# Patient Record
Sex: FEMALE | Race: BLACK OR AFRICAN AMERICAN | NOT HISPANIC OR LATINO | URBAN - METROPOLITAN AREA
[De-identification: names, ages, dates, MRNs, and addresses within clinical notes are randomized per-mention and may not be internally consistent; named-entity substitution may affect disease eponyms.]

---

## 2024-05-16 ENCOUNTER — INPATIENT (INPATIENT)
Facility: HOSPITAL | Age: 34
LOS: 1 days | Discharge: HOME | End: 2024-05-18
Attending: STUDENT IN AN ORGANIZED HEALTH CARE EDUCATION/TRAINING PROGRAM | Admitting: STUDENT IN AN ORGANIZED HEALTH CARE EDUCATION/TRAINING PROGRAM
Payer: MEDICAID

## 2024-05-16 VITALS
TEMPERATURE: 99 F | SYSTOLIC BLOOD PRESSURE: 125 MMHG | RESPIRATION RATE: 17 BRPM | HEART RATE: 79 BPM | DIASTOLIC BLOOD PRESSURE: 79 MMHG

## 2024-05-16 LAB
BASOPHILS # BLD AUTO: 0.03 K/UL — SIGNIFICANT CHANGE UP (ref 0–0.2)
BASOPHILS NFR BLD AUTO: 0.3 % — SIGNIFICANT CHANGE UP (ref 0–2)
BLD GP AB SCN SERPL QL: NEGATIVE — SIGNIFICANT CHANGE UP
EOSINOPHIL # BLD AUTO: 0.04 K/UL — SIGNIFICANT CHANGE UP (ref 0–0.5)
EOSINOPHIL NFR BLD AUTO: 0.4 % — SIGNIFICANT CHANGE UP (ref 0–6)
HCT VFR BLD CALC: 35 % — SIGNIFICANT CHANGE UP (ref 34.5–45)
HGB BLD-MCNC: 12 G/DL — SIGNIFICANT CHANGE UP (ref 11.5–15.5)
IANC: 7.51 K/UL — HIGH (ref 1.8–7.4)
IMM GRANULOCYTES NFR BLD AUTO: 0.3 % — SIGNIFICANT CHANGE UP (ref 0–0.9)
LYMPHOCYTES # BLD AUTO: 1.64 K/UL — SIGNIFICANT CHANGE UP (ref 1–3.3)
LYMPHOCYTES # BLD AUTO: 16.5 % — SIGNIFICANT CHANGE UP (ref 13–44)
MCHC RBC-ENTMCNC: 29.9 PG — SIGNIFICANT CHANGE UP (ref 27–34)
MCHC RBC-ENTMCNC: 34.3 GM/DL — SIGNIFICANT CHANGE UP (ref 32–36)
MCV RBC AUTO: 87.3 FL — SIGNIFICANT CHANGE UP (ref 80–100)
MONOCYTES # BLD AUTO: 0.68 K/UL — SIGNIFICANT CHANGE UP (ref 0–0.9)
MONOCYTES NFR BLD AUTO: 6.8 % — SIGNIFICANT CHANGE UP (ref 2–14)
NEUTROPHILS # BLD AUTO: 7.51 K/UL — HIGH (ref 1.8–7.4)
NEUTROPHILS NFR BLD AUTO: 75.7 % — SIGNIFICANT CHANGE UP (ref 43–77)
NRBC # BLD: 0 /100 WBCS — SIGNIFICANT CHANGE UP (ref 0–0)
NRBC # FLD: 0 K/UL — SIGNIFICANT CHANGE UP (ref 0–0)
PLATELET # BLD AUTO: 234 K/UL — SIGNIFICANT CHANGE UP (ref 150–400)
RBC # BLD: 4.01 M/UL — SIGNIFICANT CHANGE UP (ref 3.8–5.2)
RBC # FLD: 14.2 % — SIGNIFICANT CHANGE UP (ref 10.3–14.5)
RH IG SCN BLD-IMP: POSITIVE — SIGNIFICANT CHANGE UP
WBC # BLD: 9.93 K/UL — SIGNIFICANT CHANGE UP (ref 3.8–10.5)
WBC # FLD AUTO: 9.93 K/UL — SIGNIFICANT CHANGE UP (ref 3.8–10.5)

## 2024-05-16 RX ORDER — CITRIC ACID/SODIUM CITRATE 300-500 MG
15 SOLUTION, ORAL ORAL EVERY 6 HOURS
Refills: 0 | Status: DISCONTINUED | OUTPATIENT
Start: 2024-05-16 | End: 2024-05-17

## 2024-05-16 RX ORDER — SODIUM CHLORIDE 9 MG/ML
1000 INJECTION, SOLUTION INTRAVENOUS
Refills: 0 | Status: DISCONTINUED | OUTPATIENT
Start: 2024-05-16 | End: 2024-05-17

## 2024-05-16 RX ORDER — OXYTOCIN 10 UNIT/ML
333.33 VIAL (ML) INJECTION
Qty: 20 | Refills: 0 | Status: DISCONTINUED | OUTPATIENT
Start: 2024-05-16 | End: 2024-05-17

## 2024-05-16 RX ORDER — CHLORHEXIDINE GLUCONATE 213 G/1000ML
1 SOLUTION TOPICAL DAILY
Refills: 0 | Status: DISCONTINUED | OUTPATIENT
Start: 2024-05-16 | End: 2024-05-17

## 2024-05-16 RX ADMIN — SODIUM CHLORIDE 125 MILLILITER(S): 9 INJECTION, SOLUTION INTRAVENOUS at 21:30

## 2024-05-16 RX ADMIN — SODIUM CHLORIDE 125 MILLILITER(S): 9 INJECTION, SOLUTION INTRAVENOUS at 18:40

## 2024-05-16 NOTE — OB PROVIDER H&P - NSGENETICTESTING_OBGYN_ALL_OB
Pts spouse called to follow up on the [facility] paper work that was given to  On the last visit(05/01/2024.  is asking for a call from Dr Anderson concerning this.    Not applicable

## 2024-05-16 NOTE — OB PROVIDER LABOR PROGRESS NOTE - ASSESSMENT
34y  @40w6d admitted for rrIOL now with cervical balloon in place    Continue cont EFM, IVF, toco  Continue BC/CB for IOL    Plan per Dr. Romi Mercer PGY1

## 2024-05-16 NOTE — OB PROVIDER H&P - ASSESSMENT
A/P: Pt is a 34y  who presents for rrIOL    1. Admit to L&D. Routine Labs. IVF  2. IOL with buccal cytotec and cervical balloon  3. Fetus: Vertex, CEFM  4. GBS neg  5. Pain: IV pain meds/epidural PRN     Discussed with Dr. Romi Mercer MD, PGY-1  Obstetrics and Gynecology

## 2024-05-16 NOTE — OB PROVIDER LABOR PROGRESS NOTE - ASSESSMENT
A/P:   - Labor:  eIOL. Currently on buccal cytotec, membranes intact. Plan to continue buccal cytotec and will re-evaluate after a few more doses.  - Fetus: cat 1  - GBS: negative  - Pain: epidural    Marii Magana, PGY-1  d/w Dr. Llanos

## 2024-05-16 NOTE — OB PROVIDER LABOR PROGRESS NOTE - ASSESSMENT
A/P:   - Labor:  eIOL. Currently on buccal cytotec. SROM clear fluid at 9:40PM. To be switched to pitocin vs continue buccal cytotec.  - Fetus: cat 2, overall status reassuring  - GBS: negative  - Pain: epidural    Marii Chino, PGY-1  d/w Dr. Llanos A/P:   - Labor:  eIOL. Currently on buccal cytotec. SROM clear fluid at 9:40PM. To  be expectantly managed, will start low dose pitocin if the contractions space out  - Fetus: cat 2, overall status reassuring  - GBS: negative  - Pain: epidural    Marii Magana, PGY-1  d/w Dr. Llanos

## 2024-05-16 NOTE — OB RN PATIENT PROFILE - WEIGHT: TOTAL WEIGHT IN LBS
Detail Level: Detailed Add 02748 Cpt? (Important Note: In 2017 The Use Of 37930 Is Being Tracked By Cms To Determine Future Global Period Reimbursement For Global Periods): no 6

## 2024-05-16 NOTE — OB RN PATIENT PROFILE - FALL HARM RISK - UNIVERSAL INTERVENTIONS
Bed in lowest position, wheels locked, appropriate side rails in place/Call bell, personal items and telephone in reach/Instruct patient to call for assistance before getting out of bed or chair/Non-slip footwear when patient is out of bed/[place] to call system/Physically safe environment - no spills, clutter or unnecessary equipment/Purposeful Proactive Rounding/Room/bathroom lighting operational, light cord in reach

## 2024-05-16 NOTE — OB PROVIDER H&P - HISTORY OF PRESENT ILLNESS
HPI: Pt is a 34y   @40w6d presenting for rrIOL. PNC otherwise uncomplicated.  FM (+)  LOF (-)  CTX (-)  VB (-)  GBS neg  EFW: 4000g    OBHx: SAB x1 no D&C ()  GynHx: Denies uterine polyps, ovarian cysts, no abnml paps or STI/STDs. +L lateral anterior [id number] fibroid  PMHx: Denies  PSHx: 2 knee surgeries  Med: PNV  All: NKDA  Psych: Denies hx of mental health issues  SH: Denies hx of smoking, drinking, or drug usage during the pregnancy

## 2024-05-16 NOTE — OB PROVIDER H&P - NSHPPHYSICALEXAM_GEN_ALL_CORE
Vital Signs Last 24 Hrs  T(C): --  T(F): --  HR: 79 (16 May 2024 17:20) (79 - 79)  BP: 125/79 (16 May 2024 17:20) (125/79 - 125/79)  BP(mean): --  RR: --  SpO2: --        SVE: 2.5/50/-3  Sono: Vertex

## 2024-05-17 LAB
RH IG SCN BLD-IMP: POSITIVE — SIGNIFICANT CHANGE UP
T PALLIDUM AB TITR SER: NEGATIVE — SIGNIFICANT CHANGE UP

## 2024-05-17 RX ORDER — IBUPROFEN 200 MG
600 TABLET ORAL EVERY 6 HOURS
Refills: 0 | Status: COMPLETED | OUTPATIENT
Start: 2024-05-17 | End: 2025-04-15

## 2024-05-17 RX ORDER — DIBUCAINE 1 %
1 OINTMENT (GRAM) RECTAL EVERY 6 HOURS
Refills: 0 | Status: DISCONTINUED | OUTPATIENT
Start: 2024-05-17 | End: 2024-05-18

## 2024-05-17 RX ORDER — HYDROCORTISONE 1 %
1 OINTMENT (GRAM) TOPICAL EVERY 6 HOURS
Refills: 0 | Status: DISCONTINUED | OUTPATIENT
Start: 2024-05-17 | End: 2024-05-18

## 2024-05-17 RX ORDER — SODIUM CHLORIDE 9 MG/ML
1000 INJECTION INTRAMUSCULAR; INTRAVENOUS; SUBCUTANEOUS
Refills: 0 | Status: DISCONTINUED | OUTPATIENT
Start: 2024-05-17 | End: 2024-05-17

## 2024-05-17 RX ORDER — SODIUM CHLORIDE 9 MG/ML
3 INJECTION INTRAMUSCULAR; INTRAVENOUS; SUBCUTANEOUS EVERY 8 HOURS
Refills: 0 | Status: DISCONTINUED | OUTPATIENT
Start: 2024-05-17 | End: 2024-05-18

## 2024-05-17 RX ORDER — IBUPROFEN 200 MG
1 TABLET ORAL
Qty: 0 | Refills: 0 | DISCHARGE
Start: 2024-05-17

## 2024-05-17 RX ORDER — LANOLIN
1 OINTMENT (GRAM) TOPICAL EVERY 6 HOURS
Refills: 0 | Status: DISCONTINUED | OUTPATIENT
Start: 2024-05-17 | End: 2024-05-18

## 2024-05-17 RX ORDER — BENZOCAINE 10 %
1 GEL (GRAM) MUCOUS MEMBRANE EVERY 6 HOURS
Refills: 0 | Status: DISCONTINUED | OUTPATIENT
Start: 2024-05-17 | End: 2024-05-18

## 2024-05-17 RX ORDER — OXYCODONE HYDROCHLORIDE 5 MG/1
5 TABLET ORAL ONCE
Refills: 0 | Status: DISCONTINUED | OUTPATIENT
Start: 2024-05-17 | End: 2024-05-18

## 2024-05-17 RX ORDER — OXYTOCIN 10 UNIT/ML
41.67 VIAL (ML) INJECTION
Qty: 20 | Refills: 0 | Status: DISCONTINUED | OUTPATIENT
Start: 2024-05-17 | End: 2024-05-17

## 2024-05-17 RX ORDER — ACETAMINOPHEN 500 MG
975 TABLET ORAL
Refills: 0 | Status: DISCONTINUED | OUTPATIENT
Start: 2024-05-17 | End: 2024-05-18

## 2024-05-17 RX ORDER — MAGNESIUM HYDROXIDE 400 MG/1
30 TABLET, CHEWABLE ORAL
Refills: 0 | Status: DISCONTINUED | OUTPATIENT
Start: 2024-05-17 | End: 2024-05-18

## 2024-05-17 RX ORDER — SIMETHICONE 80 MG/1
80 TABLET, CHEWABLE ORAL EVERY 4 HOURS
Refills: 0 | Status: DISCONTINUED | OUTPATIENT
Start: 2024-05-17 | End: 2024-05-18

## 2024-05-17 RX ORDER — DIPHENHYDRAMINE HCL 50 MG
25 CAPSULE ORAL EVERY 6 HOURS
Refills: 0 | Status: DISCONTINUED | OUTPATIENT
Start: 2024-05-17 | End: 2024-05-18

## 2024-05-17 RX ORDER — OXYCODONE HYDROCHLORIDE 5 MG/1
5 TABLET ORAL
Refills: 0 | Status: DISCONTINUED | OUTPATIENT
Start: 2024-05-17 | End: 2024-05-18

## 2024-05-17 RX ORDER — IBUPROFEN 200 MG
600 TABLET ORAL EVERY 6 HOURS
Refills: 0 | Status: DISCONTINUED | OUTPATIENT
Start: 2024-05-17 | End: 2024-05-18

## 2024-05-17 RX ORDER — TETANUS TOXOID, REDUCED DIPHTHERIA TOXOID AND ACELLULAR PERTUSSIS VACCINE, ADSORBED 5; 2.5; 8; 8; 2.5 [IU]/.5ML; [IU]/.5ML; UG/.5ML; UG/.5ML; UG/.5ML
0.5 SUSPENSION INTRAMUSCULAR ONCE
Refills: 0 | Status: DISCONTINUED | OUTPATIENT
Start: 2024-05-17 | End: 2024-05-18

## 2024-05-17 RX ORDER — AER TRAVELER 0.5 G/1
1 SOLUTION RECTAL; TOPICAL EVERY 4 HOURS
Refills: 0 | Status: DISCONTINUED | OUTPATIENT
Start: 2024-05-17 | End: 2024-05-18

## 2024-05-17 RX ORDER — KETOROLAC TROMETHAMINE 30 MG/ML
30 SYRINGE (ML) INJECTION ONCE
Refills: 0 | Status: DISCONTINUED | OUTPATIENT
Start: 2024-05-17 | End: 2024-05-17

## 2024-05-17 RX ORDER — PRAMOXINE HYDROCHLORIDE 150 MG/15G
1 AEROSOL, FOAM RECTAL EVERY 4 HOURS
Refills: 0 | Status: DISCONTINUED | OUTPATIENT
Start: 2024-05-17 | End: 2024-05-18

## 2024-05-17 RX ADMIN — Medication 975 MILLIGRAM(S): at 21:32

## 2024-05-17 RX ADMIN — Medication 600 MILLIGRAM(S): at 18:03

## 2024-05-17 RX ADMIN — Medication 975 MILLIGRAM(S): at 08:36

## 2024-05-17 RX ADMIN — Medication 975 MILLIGRAM(S): at 09:30

## 2024-05-17 RX ADMIN — Medication 30 MILLIGRAM(S): at 06:00

## 2024-05-17 RX ADMIN — Medication 30 MILLIGRAM(S): at 05:31

## 2024-05-17 RX ADMIN — SODIUM CHLORIDE 3 MILLILITER(S): 9 INJECTION INTRAMUSCULAR; INTRAVENOUS; SUBCUTANEOUS at 13:40

## 2024-05-17 RX ADMIN — SODIUM CHLORIDE 3 MILLILITER(S): 9 INJECTION INTRAMUSCULAR; INTRAVENOUS; SUBCUTANEOUS at 22:00

## 2024-05-17 RX ADMIN — SODIUM CHLORIDE 125 MILLILITER(S): 9 INJECTION INTRAMUSCULAR; INTRAVENOUS; SUBCUTANEOUS at 01:12

## 2024-05-17 RX ADMIN — Medication 1 TABLET(S): at 12:43

## 2024-05-17 RX ADMIN — SODIUM CHLORIDE 125 MILLILITER(S): 9 INJECTION, SOLUTION INTRAVENOUS at 01:12

## 2024-05-17 RX ADMIN — Medication 975 MILLIGRAM(S): at 16:10

## 2024-05-17 RX ADMIN — Medication 600 MILLIGRAM(S): at 13:40

## 2024-05-17 RX ADMIN — Medication 975 MILLIGRAM(S): at 22:00

## 2024-05-17 RX ADMIN — Medication 600 MILLIGRAM(S): at 12:43

## 2024-05-17 RX ADMIN — Medication 975 MILLIGRAM(S): at 15:14

## 2024-05-17 RX ADMIN — Medication 600 MILLIGRAM(S): at 19:00

## 2024-05-17 RX ADMIN — Medication 125 MILLIUNIT(S)/MIN: at 04:43

## 2024-05-17 NOTE — OB RN DELIVERY SUMMARY - NS_NUCHALCORD_OBGYN_ALL_OB
PDMP reviewed  Last dispensed: 2/2/24  Quantity: 60  Last prescribed by: Rosalia Irwin  Pharmacy filled by: [id number] - [place], [place] - [address]  [address] [address]     Writer spoke with patient and patient is needing his 20 mg of Adderall before he leaves today at 12, he is taking his mother out of town for her cancer treatment. Patient was made aware that there is no guaranty that the prescriptions will be ready by then since PCP is not in the office today but writer told patient that we will send it as high priority. Patient had no further questions at this time.   None

## 2024-05-17 NOTE — OB NEONATOLOGY/PEDIATRICIAN DELIVERY SUMMARY - NSPEDSNEONOTESA_OBGYN_ALL_OB_FT
Peds called to LDR for Cat 2 of 41 wk AGA  female born via  to a 40 y/o  mother.   No significant maternal or prenatal history. Maternal labs include Blood Type O+, HIV - , RPR NR , Rubella I , Hep B - , GBS - . SROM at 2140 on  with clear fluids (ROM hours: ~6H).  Baby emerged vigorous, crying, was w/d/s/s with APGARS of 8/9.. Resuscitation included: tactile stimulation and bulb suction. Mom plans to initiate breastfeeding, declines Hep B vaccine .  Highest maternal temp: 37.3. EOS 0.22.    :   TOB: 0334  Weight: 3720g    Physical Exam:  Gen: no acute distress, +grimace  HEENT:  anterior fontanel open soft and flat, nondysmorphic facies, no cleft lip/palate, ears normal set, no ear pits or tags, nares clinically patent, (+) mild caput  Resp: Normal respiratory effort without grunting or retractions, good air entry b/l, clear to auscultation bilaterally  Cardio: Present S1/S2, regular rate and rhythm, no murmurs  Abd: soft, non tender, non distended, umbilical cord with 3 vessels  Neuro: +palmar and plantar grasp, +suck, +najma, normal tone  Extremities: negative kuo and ortolani maneuvers, moving all extremities, no clavicular crepitus or stepoff  Skin: pink, warm  Genitals:Normal female anatomy, [place] 1, anus patent

## 2024-05-17 NOTE — DISCHARGE NOTE OB - CARE PLAN
Principal Discharge DX:	 (normal spontaneous vaginal delivery)  Assessment and plan of treatment:	Return to normal activities of daily living   1

## 2024-05-17 NOTE — DISCHARGE NOTE OB - MATERIALS PROVIDED
Vaccinations/  Immunization Record/Breastfeeding Log/Guide to Postpartum Care/Back To Sleep Handout/Shaken Baby Prevention Handout/Birth Certificate Instructions/Discharge Medication Information for Patients and Families Pocket Guide

## 2024-05-17 NOTE — OB PROVIDER DELIVERY SUMMARY - NSLOWPPHRISK_OBGYN_A_OB
No previous uterine incision/[place] Pregnancy/Less than or equal to 4 previous vaginal births/No known bleeding disorder/No history of postpartum hemorrhage

## 2024-05-17 NOTE — DISCHARGE NOTE OB - CARE PROVIDER_API CALL
Alfredo Blackwell  Obstetrics and Gynecology  [address], [address]  [place] [address]  Phone: [phone]  Fax: [phone]  Follow Up Time:

## 2024-05-17 NOTE — OB RN DELIVERY SUMMARY - NS_SEPSISRSKCALC_OBGYN_ALL_OB_FT
EOS calculated successfully. EOS Risk Factor: 0.5/1000 live births ([facility] national incidence); GA=41w;Temp=99.14; ROM=5.9; GBS='Negative'; Antibiotics='No antibiotics or any antibiotics < 2 hrs prior to birth'

## 2024-05-17 NOTE — OB PROVIDER DELIVERY SUMMARY - NSSELHIDDEN_OBGYN_ALL_OB_FT
[NS_DeliveryAttending1_OBGYN_ALL_OB_FT:[email or web address]=],[NS_DeliveryAssist1_OBGYN_ALL_OB_FT:[id number]=]

## 2024-05-17 NOTE — DISCHARGE NOTE OB - NS MD DC FALL RISK RISK
today/chest pressure For information on Fall & Injury Prevention, visit: [email or web address] OR  [email or web address] OR  https://www.cdc.gov/steadi/patient.html

## 2024-05-17 NOTE — OB RN DELIVERY SUMMARY - NSSELHIDDEN_OBGYN_ALL_OB_FT
[NS_DeliveryAttending1_OBGYN_ALL_OB_FT:[email or web address]=],[NS_DeliveryAssist1_OBGYN_ALL_OB_FT:[id number]=],[NS_DeliveryRN_OBGYN_ALL_OB_FT:[id number]=]

## 2024-05-17 NOTE — DISCHARGE NOTE OB - PATIENT PORTAL LINK FT
You can access the FollowMyHealth Patient Portal offered by [facility] by registering at the following website: [email or web address]. By joining [facility]’s FollowMyHealth portal, you will also be able to view your health information using other applications (apps) compatible with our system.

## 2024-05-17 NOTE — OB PROVIDER LABOR PROGRESS NOTE - ASSESSMENT
Plan: 33yo  @ 41w. Recurrent late decels  - IVF bolus  - IUPC placed and amnioinfusion started  - no induction agents at this time  - Continuous EFM, [place]  - Con't IVF    d/w attending physician Dr. Romi Lujan MD  PGY-2 Plan: 35yo  @ 41w. Recurrent late decels  - IVF bolus  - IUPC placed and amnioinfusion started  - no induction agents at this time  - Continuous EFM, [place]  - Con't IVF    d/w attending physician Dr. Romi Lujan MD  PGY-2 Plan: 35yo  @ [facility]. Recurrent late/mel decels for last 20-30 minutes despite maternal repositioning and fluids  - patient already received IVF bolus  - IUPC placed and amnioinfusion started  - no induction agents at this time  - Continuous EFM, [place]  - Con't IVF    d/w attending physician Dr. Romi Lujan MD  PGY-2

## 2024-05-17 NOTE — OB PROVIDER DELIVERY SUMMARY - NS_ADMITROM_OBGYN_ALL_OB
normal... Well appearing, well nourished, awake, alert, oriented to person, place, time/situation and in no apparent distress. No Well appearing, well nourished, anxious, awake, alert, oriented to person, place, time/situation. Well appearing, well nourished, anxious, awake, alert, oriented to person, place, time/situation, in on acute distress.

## 2024-05-17 NOTE — OB PROVIDER LABOR PROGRESS NOTE - NS_OBIHIFHRDETAILS_OBGYN_ALL_OB_FT
baseline 145, mod mel, - accels, recurrent late decels baseline 145, mod mel, - accels, recurrent late/variable decels

## 2024-05-17 NOTE — DISCHARGE NOTE OB - HOSPITAL COURSE
Patient admitted for induction of labor, she had a normal spontaneous vaginal delivery of a live female infant, her postpartum course was uncomplicated

## 2024-05-17 NOTE — DISCHARGE NOTE OB - MEDICATION SUMMARY - MEDICATIONS TO TAKE
I will START or STAY ON the medications listed below when I get home from the hospital:    ibuprofen 600 mg oral tablet  -- 1 tab(s) by mouth every 6 hours  -- Indication: For Pain   I will START or STAY ON the medications listed below when I get home from the hospital:    ibuprofen 600 mg oral tablet  -- 1 tab(s) by mouth every 6 hours as needed for  moderate pain  -- Indication: For Pain    acetaminophen 325 mg oral tablet  -- 3 tab(s) by mouth every 6 hours as needed for  mild pain  -- Indication: For pain    Prenatal Multivitamins with Folic Acid 1 mg oral tablet  -- 1 tab(s) by mouth once a day  -- Indication: For vitamins

## 2024-05-17 NOTE — OB PROVIDER DELIVERY SUMMARY - NSPROVIDERDELIVERYNOTE_OBGYN_ALL_OB_FT
The patient became fully dilated with urge to push.  of a viable female infant. Head, shoulders and body delivered easily in OA position. No nuchal cord. There was delayed cord clamping of 1min. Cord gases obtained. The placenta delivered spontaneously, intact, with a three vessel cord. Pitocin was administered. The uterus, cervix, vagina and perineum were palpated and inspected, no retained products were noted. Bimanual exam performed, with minimal clot evacuated. Fundus and lower uterine segment firm. Second degree laceration of the perineum noted and Right periurethral laceration noted. The lacerations were repaired with chromic in the usual manner with restoration of anatomy and excellent hemostasis.    Present for delivery were Dr. Chino Nguyen PGY-1

## 2024-05-18 VITALS
DIASTOLIC BLOOD PRESSURE: 75 MMHG | TEMPERATURE: 98 F | OXYGEN SATURATION: 100 % | RESPIRATION RATE: 18 BRPM | SYSTOLIC BLOOD PRESSURE: 117 MMHG | HEART RATE: 75 BPM

## 2024-05-18 RX ORDER — ACETAMINOPHEN 500 MG
3 TABLET ORAL
Qty: 0 | Refills: 0 | DISCHARGE
Start: 2024-05-18

## 2024-05-18 RX ADMIN — Medication 600 MILLIGRAM(S): at 13:00

## 2024-05-18 RX ADMIN — MAGNESIUM HYDROXIDE 30 MILLILITER(S): 400 TABLET, CHEWABLE ORAL at 06:36

## 2024-05-18 RX ADMIN — SODIUM CHLORIDE 3 MILLILITER(S): 9 INJECTION INTRAMUSCULAR; INTRAVENOUS; SUBCUTANEOUS at 07:00

## 2024-05-18 RX ADMIN — Medication 600 MILLIGRAM(S): at 00:27

## 2024-05-18 RX ADMIN — Medication 975 MILLIGRAM(S): at 08:22

## 2024-05-18 RX ADMIN — Medication 600 MILLIGRAM(S): at 01:00

## 2024-05-18 RX ADMIN — Medication 975 MILLIGRAM(S): at 04:01

## 2024-05-18 RX ADMIN — Medication 975 MILLIGRAM(S): at 09:13

## 2024-05-18 RX ADMIN — Medication 600 MILLIGRAM(S): at 12:12

## 2024-05-18 RX ADMIN — Medication 975 MILLIGRAM(S): at 03:31

## 2024-05-18 RX ADMIN — Medication 600 MILLIGRAM(S): at 06:32

## 2024-05-18 RX ADMIN — Medication 600 MILLIGRAM(S): at 07:00

## 2024-05-18 NOTE — CHART NOTE - NSCHARTNOTEFT_GEN_A_CORE
ATT:  Review of past 2 hrs of labor course. FRom 10:30-midnight- FHR with variable and late decels despite fluids, position changes. IUPC with amnio infusion placed with slow resolution of decels. FHR tracing category I with ctx q 2-4 mins with some coupling of ctx. Pt having more pressure with cervix tight 8 cm/80%. -1. Will give epidural bolus and continue to closely observe. Dr Blackwell informed and heading to hospital. Will consider pitocin to break ctx coupling. Mayela Llanos MD
PTA requested for cat 2 FHR tracing, recurrent late decelerations.   Charge RN, covering RN and  present.  Patient re-examined, , patient feels pressure with contractions but recently topped off.   Moderate variability with overall reassuring fetal status, patient making cervical change.  Will continue position changes, peanut ball to allow vertex to descend. Plan to recheck in 1-2 hours or earlier if necessary.  Anticipate     Alfredo Blackwell MD
ATT:  S: Patient doing well. Minimal lochia. Pain controlled. breastfeeding    O: Vital Signs Last 24 Hrs  T(C): 36.6 (18 May 2024 05:55), Max: 36.9 (17 May 2024 19:16)  T(F): 97.9 (18 May 2024 05:55), Max: 98.5 (17 May 2024 19:16)  HR: 75 (18 May 2024 05:55) (72 - 85)  BP: 131/84 (18 May 2024 05:55) (107/75 - 131/84)  BP(mean): --  RR: 18 (18 May 2024 05:55) (18 - 18)  SpO2: 100% (18 May 2024 05:55) (98% - 100%)    Parameters below as of 18 May 2024 05:55  Patient On (Oxygen Delivery Method): room air        Gen: NAD  Abd: soft, NT, ND, fundus firm below umbilicus  Ext: no tenderness    Labs:                        12.0   9.93  )-----------( 234      ( 16 May 2024 18:02 )             35.0       A: 34y PPD#1 s/p  doing well. Pt desires discharge today.    Plan: Continue routine postpartum care. Discharge home today.  Mayela Llanos MD

## 2024-07-29 NOTE — OB PROVIDER LABOR PROGRESS NOTE - NS_SUBJECTIVE/OBJECTIVE_OBGYN_ALL_OB_FT
Patient needs to follow up with (Gastro) office will call with appointment [phone]  Patient needs to follow up with (Surgery) on 8/5/24 @1:45pm [phone]  Patient needs to follow up with Rena Guerra(PCP) on 8/9/24 @11:00am [phone]  
R1 OB Labor Note    S: Patient seen and examined at bedside. SROM clear fluid at 9:40PM    T(C): 37.3 (05-16-24 @ 18:15), Max: 37.3 (05-16-24 @ 17:17)  HR: 72 (05-16-24 @ 21:59) (68 - 85)  BP: 112/59 (05-16-24 @ 21:58) (99/57 - 135/81)  RR: 17 (05-16-24 @ 18:15) (17 - 17)  SpO2: 100% (05-16-24 @ 21:59) (89% - 100%)
Pt seen and examined for cervical balloon placement. Cervical balloon placed in usual fashion.
R1 OB Labor Note    S: Patient seen and examined at bedside.    T(C): 37.3 (05-16-24 @ 18:15), Max: 37.3 (05-16-24 @ 17:17)  HR: 77 (05-16-24 @ 21:19) (68 - 85)  BP: 106/59 (05-16-24 @ 21:12) (99/57 - 135/81)  RR: 17 (05-16-24 @ 18:15) (17 - 17)  SpO2: 100% (05-16-24 @ 21:14) (89% - 100%)
R2 Labor & Delivery Progress Note     Pt seen & examined at bedside.    T(C): 36.9 (05-16-24 @ 22:00), Max: 37.3 (05-16-24 @ 17:17)  HR: 78 (05-17-24 @ 00:40) (68 - 88)  BP: 129/62 (05-17-24 @ 00:27) (99/57 - 135/81)  RR: 18 (05-16-24 @ 22:00) (17 - 18)  SpO2: 100% (05-17-24 @ 00:40) (71% - 100%)

## 2025-03-01 ENCOUNTER — NON-APPOINTMENT (OUTPATIENT)
Age: 35
End: 2025-03-01

## 2025-03-03 ENCOUNTER — APPOINTMENT (OUTPATIENT)
Dept: OBGYN | Facility: CLINIC | Age: 35
End: 2025-03-03
Payer: MEDICAID

## 2025-03-03 ENCOUNTER — ASOB RESULT (OUTPATIENT)
Age: 35
End: 2025-03-03

## 2025-03-03 VITALS
WEIGHT: 244 LBS | SYSTOLIC BLOOD PRESSURE: 131 MMHG | HEIGHT: 70 IN | HEART RATE: 81 BPM | BODY MASS INDEX: 34.93 KG/M2 | DIASTOLIC BLOOD PRESSURE: 84 MMHG

## 2025-03-03 DIAGNOSIS — Z32.01 ENCOUNTER FOR PREGNANCY TEST, RESULT POSITIVE: ICD-10-CM

## 2025-03-03 PROCEDURE — 76830 TRANSVAGINAL US NON-OB: CPT

## 2025-03-03 PROCEDURE — 99214 OFFICE O/P EST MOD 30 MIN: CPT

## 2025-03-04 LAB
C TRACH RRNA SPEC QL NAA+PROBE: NOT DETECTED
N GONORRHOEA RRNA SPEC QL NAA+PROBE: NOT DETECTED
SOURCE AMPLIFICATION: NORMAL

## 2025-03-05 LAB — BACTERIA UR CULT: NORMAL

## 2025-03-20 ENCOUNTER — APPOINTMENT (OUTPATIENT)
Dept: OBGYN | Facility: CLINIC | Age: 35
End: 2025-03-20
Payer: MEDICAID

## 2025-03-20 ENCOUNTER — ASOB RESULT (OUTPATIENT)
Age: 35
End: 2025-03-20

## 2025-03-20 VITALS
WEIGHT: 241 LBS | HEIGHT: 70 IN | DIASTOLIC BLOOD PRESSURE: 80 MMHG | SYSTOLIC BLOOD PRESSURE: 120 MMHG | HEART RATE: 80 BPM | BODY MASS INDEX: 34.5 KG/M2

## 2025-03-20 DIAGNOSIS — Z32.00 ENCOUNTER FOR PREGNANCY TEST, RESULT UNKNOWN: ICD-10-CM

## 2025-03-20 PROCEDURE — 99213 OFFICE O/P EST LOW 20 MIN: CPT

## 2025-03-20 PROCEDURE — 76817 TRANSVAGINAL US OBSTETRIC: CPT

## 2025-03-21 LAB
ABORH: NORMAL
ANTIBODY SCREEN: NORMAL
BASOPHILS # BLD AUTO: 0.08 K/UL
BASOPHILS NFR BLD AUTO: 0.7 %
EOSINOPHIL # BLD AUTO: 0.03 K/UL
EOSINOPHIL NFR BLD AUTO: 0.3 %
ESTIMATED AVERAGE GLUCOSE: 120 MG/DL
HBA1C MFR BLD HPLC: 5.8 %
HCT VFR BLD CALC: 40.7 %
HGB BLD-MCNC: 13.4 G/DL
HIV1+2 AB SPEC QL IA.RAPID: NONREACTIVE
IMM GRANULOCYTES NFR BLD AUTO: 0.4 %
LEAD BLD-MCNC: 1.5 UG/DL
LYMPHOCYTES # BLD AUTO: 1.83 K/UL
LYMPHOCYTES NFR BLD AUTO: 16.3 %
MAN DIFF?: NORMAL
MCHC RBC-ENTMCNC: 31.6 PG
MCHC RBC-ENTMCNC: 32.9 G/DL
MCV RBC AUTO: 96 FL
MEV IGG FLD QL IA: 12.5 AU/ML
MEV IGG+IGM SER-IMP: NEGATIVE
MONOCYTES # BLD AUTO: 0.71 K/UL
MONOCYTES NFR BLD AUTO: 6.3 %
NEUTROPHILS # BLD AUTO: 8.54 K/UL
NEUTROPHILS NFR BLD AUTO: 76 %
PLATELET # BLD AUTO: 348 K/UL
RBC # BLD: 4.24 M/UL
RBC # FLD: 13.5 %
RUBV IGG FLD-ACNC: 0.88 INDEX
RUBV IGG SER-IMP: NEGATIVE
T PALLIDUM AB SER QL IA: NEGATIVE
VZV AB TITR SER: POSITIVE
VZV IGG SER IF-ACNC: 8.47 S/CO
WBC # FLD AUTO: 11.23 K/UL

## 2025-03-24 LAB
HBV SURFACE AG SER QL: NONREACTIVE
HCV AB SER QL: NONREACTIVE
HCV S/CO RATIO: 0.12 S/CO

## 2025-04-01 ENCOUNTER — TRANSCRIPTION ENCOUNTER (OUTPATIENT)
Age: 35
End: 2025-04-01

## 2025-04-03 ENCOUNTER — TRANSCRIPTION ENCOUNTER (OUTPATIENT)
Age: 35
End: 2025-04-03

## 2025-04-07 ENCOUNTER — APPOINTMENT (OUTPATIENT)
Dept: OBGYN | Facility: CLINIC | Age: 35
End: 2025-04-07
Payer: MEDICAID

## 2025-04-07 ENCOUNTER — ASOB RESULT (OUTPATIENT)
Age: 35
End: 2025-04-07

## 2025-04-07 VITALS
DIASTOLIC BLOOD PRESSURE: 77 MMHG | WEIGHT: 239 LBS | BODY MASS INDEX: 34.22 KG/M2 | SYSTOLIC BLOOD PRESSURE: 117 MMHG | HEART RATE: 86 BPM | HEIGHT: 70 IN

## 2025-04-07 DIAGNOSIS — O41.8X90 OTHER SPECIFIED DISORDERS OF AMNIOTIC FLUID AND MEMBRANES, UNSPECIFIED TRIMESTER, NOT APPLICABLE OR UNSPECIFIED: ICD-10-CM

## 2025-04-07 DIAGNOSIS — O46.8X9 OTHER SPECIFIED DISORDERS OF AMNIOTIC FLUID AND MEMBRANES, UNSPECIFIED TRIMESTER, NOT APPLICABLE OR UNSPECIFIED: ICD-10-CM

## 2025-04-07 PROCEDURE — 76816 OB US FOLLOW-UP PER FETUS: CPT

## 2025-04-07 PROCEDURE — 99214 OFFICE O/P EST MOD 30 MIN: CPT

## 2025-04-10 ENCOUNTER — APPOINTMENT (OUTPATIENT)
Dept: ANTEPARTUM | Facility: CLINIC | Age: 35
End: 2025-04-10

## 2025-04-24 ENCOUNTER — NON-APPOINTMENT (OUTPATIENT)
Age: 35
End: 2025-04-24

## 2025-04-24 ENCOUNTER — APPOINTMENT (OUTPATIENT)
Dept: ANTEPARTUM | Facility: CLINIC | Age: 35
End: 2025-04-24
Payer: MEDICAID

## 2025-04-24 ENCOUNTER — APPOINTMENT (OUTPATIENT)
Dept: OBGYN | Facility: CLINIC | Age: 35
End: 2025-04-24
Payer: MEDICAID

## 2025-04-24 ENCOUNTER — ASOB RESULT (OUTPATIENT)
Age: 35
End: 2025-04-24

## 2025-04-24 VITALS
BODY MASS INDEX: 34.65 KG/M2 | HEIGHT: 70 IN | DIASTOLIC BLOOD PRESSURE: 82 MMHG | SYSTOLIC BLOOD PRESSURE: 129 MMHG | WEIGHT: 242 LBS | HEART RATE: 83 BPM

## 2025-04-24 DIAGNOSIS — Z34.81 ENCOUNTER FOR SUPERVISION OF OTHER NORMAL PREGNANCY, FIRST TRIMESTER: ICD-10-CM

## 2025-04-24 PROCEDURE — 76801 OB US < 14 WKS SINGLE FETUS: CPT

## 2025-04-24 PROCEDURE — 76813 OB US NUCHAL MEAS 1 GEST: CPT

## 2025-04-24 PROCEDURE — 0502F SUBSEQUENT PRENATAL CARE: CPT

## 2025-05-22 ENCOUNTER — NON-APPOINTMENT (OUTPATIENT)
Age: 35
End: 2025-05-22

## 2025-05-22 ENCOUNTER — APPOINTMENT (OUTPATIENT)
Dept: OBGYN | Facility: CLINIC | Age: 35
End: 2025-05-22
Payer: MEDICAID

## 2025-05-22 VITALS
BODY MASS INDEX: 34.65 KG/M2 | DIASTOLIC BLOOD PRESSURE: 65 MMHG | SYSTOLIC BLOOD PRESSURE: 108 MMHG | HEART RATE: 86 BPM | WEIGHT: 242 LBS | HEIGHT: 70 IN

## 2025-05-22 DIAGNOSIS — Z34.91 ENCOUNTER FOR SUPERVISION OF NORMAL PREGNANCY, UNSPECIFIED, FIRST TRIMESTER: ICD-10-CM

## 2025-05-22 PROCEDURE — 0502F SUBSEQUENT PRENATAL CARE: CPT

## 2025-05-23 LAB
2ND TRIMESTER 4 SCREEN SERPL-IMP: NO
AFP ADJ MOM SERPL: 0.68
AFP INTERP SERPL-IMP: NORMAL
AFP INTERP SERPL-IMP: NORMAL
AFP MOM CUT-OFF: 2.8
AFP PERCENTILE: 11.4
AFP SERPL-ACNC: 22.49 NG/ML
CARBAMAZEPINE?: NO
CURRENT SMOKER: NO
DIABETES STATUS PATIENT: NO
GA: NORMAL
GESTATIONAL AGE METHOD: NORMAL
HX OF NTD NARR: NO
MULTIPLE PREGNANCY: NORMAL
NEURAL TUBE DEFECT RISK FETUS: NORMAL
NEURAL TUBE DEFECT RISK POP: NORMAL
TEST PERFORMANCE INFO SPEC: NORMAL
VALPROIC ACID?: NORMAL

## 2025-05-28 ENCOUNTER — RX RENEWAL (OUTPATIENT)
Age: 35
End: 2025-05-28

## 2025-05-28 ENCOUNTER — TRANSCRIPTION ENCOUNTER (OUTPATIENT)
Age: 35
End: 2025-05-28

## 2025-05-29 ENCOUNTER — NON-APPOINTMENT (OUTPATIENT)
Age: 35
End: 2025-05-29

## 2025-06-11 ENCOUNTER — APPOINTMENT (OUTPATIENT)
Dept: ANTEPARTUM | Facility: CLINIC | Age: 35
End: 2025-06-11

## 2025-06-11 ENCOUNTER — ASOB RESULT (OUTPATIENT)
Age: 35
End: 2025-06-11

## 2025-06-11 PROCEDURE — 76811 OB US DETAILED SNGL FETUS: CPT

## 2025-06-25 ENCOUNTER — ASOB RESULT (OUTPATIENT)
Age: 35
End: 2025-06-25

## 2025-06-25 ENCOUNTER — APPOINTMENT (OUTPATIENT)
Dept: ANTEPARTUM | Facility: CLINIC | Age: 35
End: 2025-06-25

## 2025-06-25 PROCEDURE — 76816 OB US FOLLOW-UP PER FETUS: CPT

## 2025-06-30 ENCOUNTER — APPOINTMENT (OUTPATIENT)
Dept: OBGYN | Facility: CLINIC | Age: 35
End: 2025-06-30
Payer: MEDICAID

## 2025-06-30 VITALS
DIASTOLIC BLOOD PRESSURE: 70 MMHG | HEIGHT: 70 IN | HEART RATE: 85 BPM | WEIGHT: 245 LBS | SYSTOLIC BLOOD PRESSURE: 107 MMHG | BODY MASS INDEX: 35.07 KG/M2

## 2025-06-30 PROBLEM — Z34.82 ENCOUNTER FOR SUPERVISION OF NORMAL PREGNANCY IN MULTIGRAVIDA IN SECOND TRIMESTER: Status: ACTIVE | Noted: 2025-06-30

## 2025-06-30 PROCEDURE — 0502F SUBSEQUENT PRENATAL CARE: CPT

## 2025-07-23 ENCOUNTER — APPOINTMENT (OUTPATIENT)
Dept: OBGYN | Facility: CLINIC | Age: 35
End: 2025-07-23

## 2025-07-28 ENCOUNTER — MED ADMIN CHARGE (OUTPATIENT)
Age: 35
End: 2025-07-28

## 2025-07-28 ENCOUNTER — APPOINTMENT (OUTPATIENT)
Dept: OBGYN | Facility: CLINIC | Age: 35
End: 2025-07-28
Payer: MEDICAID

## 2025-07-28 VITALS
DIASTOLIC BLOOD PRESSURE: 71 MMHG | SYSTOLIC BLOOD PRESSURE: 109 MMHG | WEIGHT: 241 LBS | HEART RATE: 88 BPM | BODY MASS INDEX: 34.5 KG/M2 | HEIGHT: 70 IN

## 2025-07-28 DIAGNOSIS — O09.522 SUPERVISION OF ELDERLY MULTIGRAVIDA, SECOND TRIMESTER: ICD-10-CM

## 2025-07-28 DIAGNOSIS — Z23 ENCOUNTER FOR IMMUNIZATION: ICD-10-CM

## 2025-07-28 PROCEDURE — 90715 TDAP VACCINE 7 YRS/> IM: CPT

## 2025-07-28 PROCEDURE — 0502F SUBSEQUENT PRENATAL CARE: CPT

## 2025-07-28 PROCEDURE — 90471 IMMUNIZATION ADMIN: CPT

## 2025-07-29 LAB
GLUCOSE 1H P 50 G GLC PO SERPL-MCNC: 122 MG/DL
HCT VFR BLD CALC: 35.4 %
HGB BLD-MCNC: 11.7 G/DL
MCHC RBC-ENTMCNC: 29.8 PG
MCHC RBC-ENTMCNC: 33.1 G/DL
MCV RBC AUTO: 90.3 FL
PLATELET # BLD AUTO: 278 K/UL
RBC # BLD: 3.92 M/UL
RBC # FLD: 13.8 %
WBC # FLD AUTO: 9.17 K/UL

## 2025-08-20 ENCOUNTER — APPOINTMENT (OUTPATIENT)
Dept: OBGYN | Facility: CLINIC | Age: 35
End: 2025-08-20
Payer: MEDICAID

## 2025-08-20 ENCOUNTER — NON-APPOINTMENT (OUTPATIENT)
Age: 35
End: 2025-08-20

## 2025-08-20 VITALS
WEIGHT: 242 LBS | HEART RATE: 91 BPM | SYSTOLIC BLOOD PRESSURE: 107 MMHG | HEIGHT: 70 IN | DIASTOLIC BLOOD PRESSURE: 72 MMHG | BODY MASS INDEX: 34.65 KG/M2

## 2025-08-20 DIAGNOSIS — Z34.82 ENCOUNTER FOR SUPERVISION OF OTHER NORMAL PREGNANCY, SECOND TRIMESTER: ICD-10-CM

## 2025-08-20 PROCEDURE — 0502F SUBSEQUENT PRENATAL CARE: CPT

## 2025-08-26 ENCOUNTER — APPOINTMENT (OUTPATIENT)
Dept: ANTEPARTUM | Facility: CLINIC | Age: 35
End: 2025-08-26

## 2025-08-26 ENCOUNTER — OUTPATIENT (OUTPATIENT)
Dept: INPATIENT UNIT | Facility: HOSPITAL | Age: 35
LOS: 1 days | End: 2025-08-26
Payer: MEDICAID

## 2025-08-26 ENCOUNTER — ASOB RESULT (OUTPATIENT)
Age: 35
End: 2025-08-26

## 2025-08-26 VITALS
HEART RATE: 85 BPM | DIASTOLIC BLOOD PRESSURE: 57 MMHG | OXYGEN SATURATION: 99 % | TEMPERATURE: 98 F | SYSTOLIC BLOOD PRESSURE: 116 MMHG | RESPIRATION RATE: 18 BRPM

## 2025-08-26 VITALS — HEART RATE: 87 BPM | SYSTOLIC BLOOD PRESSURE: 114 MMHG | DIASTOLIC BLOOD PRESSURE: 72 MMHG

## 2025-08-26 DIAGNOSIS — Z98.890 OTHER SPECIFIED POSTPROCEDURAL STATES: Chronic | ICD-10-CM

## 2025-08-26 DIAGNOSIS — O26.899 OTHER SPECIFIED PREGNANCY RELATED CONDITIONS, UNSPECIFIED TRIMESTER: ICD-10-CM

## 2025-08-26 PROCEDURE — 76819 FETAL BIOPHYS PROFIL W/O NST: CPT | Mod: 26

## 2025-08-26 PROCEDURE — 99221 1ST HOSP IP/OBS SF/LOW 40: CPT | Mod: 25

## 2025-08-26 PROCEDURE — 59025 FETAL NON-STRESS TEST: CPT | Mod: 26

## 2025-08-26 PROCEDURE — 76815 OB US LIMITED FETUS(S): CPT | Mod: 26

## 2025-08-26 PROCEDURE — 83986 ASSAY PH BODY FLUID NOS: CPT | Mod: QW

## 2025-09-01 DIAGNOSIS — O99.891 OTHER SPECIFIED DISEASES AND CONDITIONS COMPLICATING PREGNANCY: ICD-10-CM

## 2025-09-01 DIAGNOSIS — Z03.71 ENCOUNTER FOR SUSPECTED PROBLEM WITH AMNIOTIC CAVITY AND MEMBRANE RULED OUT: ICD-10-CM

## 2025-09-01 DIAGNOSIS — O09.523 SUPERVISION OF ELDERLY MULTIGRAVIDA, THIRD TRIMESTER: ICD-10-CM

## 2025-09-01 DIAGNOSIS — O09.293 SUPERVISION OF PREGNANCY WITH OTHER POOR REPRODUCTIVE OR OBSTETRIC HISTORY, THIRD TRIMESTER: ICD-10-CM

## 2025-09-01 DIAGNOSIS — Z3A.31 31 WEEKS GESTATION OF PREGNANCY: ICD-10-CM

## 2025-09-01 DIAGNOSIS — D21.9 BENIGN NEOPLASM OF CONNECTIVE AND OTHER SOFT TISSUE, UNSPECIFIED: ICD-10-CM

## 2025-09-10 ENCOUNTER — APPOINTMENT (OUTPATIENT)
Dept: OBGYN | Facility: CLINIC | Age: 35
End: 2025-09-10
Payer: MEDICAID

## 2025-09-10 ENCOUNTER — APPOINTMENT (OUTPATIENT)
Dept: ANTEPARTUM | Facility: CLINIC | Age: 35
End: 2025-09-10
Payer: MEDICAID

## 2025-09-10 ENCOUNTER — ASOB RESULT (OUTPATIENT)
Age: 35
End: 2025-09-10

## 2025-09-10 VITALS
HEIGHT: 70 IN | DIASTOLIC BLOOD PRESSURE: 74 MMHG | WEIGHT: 243 LBS | BODY MASS INDEX: 34.79 KG/M2 | SYSTOLIC BLOOD PRESSURE: 114 MMHG | HEART RATE: 87 BPM

## 2025-09-10 DIAGNOSIS — O09.523 SUPERVISION OF ELDERLY MULTIGRAVIDA, THIRD TRIMESTER: ICD-10-CM

## 2025-09-10 PROCEDURE — 0502F SUBSEQUENT PRENATAL CARE: CPT

## 2025-09-10 PROCEDURE — 76816 OB US FOLLOW-UP PER FETUS: CPT

## 2025-09-10 PROCEDURE — 76819 FETAL BIOPHYS PROFIL W/O NST: CPT | Mod: 59

## 2025-09-15 ENCOUNTER — TRANSCRIPTION ENCOUNTER (OUTPATIENT)
Age: 35
End: 2025-09-15

## 2025-09-16 ENCOUNTER — TRANSCRIPTION ENCOUNTER (OUTPATIENT)
Age: 35
End: 2025-09-16